# Patient Record
Sex: FEMALE | Employment: UNEMPLOYED | URBAN - METROPOLITAN AREA
[De-identification: names, ages, dates, MRNs, and addresses within clinical notes are randomized per-mention and may not be internally consistent; named-entity substitution may affect disease eponyms.]

---

## 2021-06-05 ENCOUNTER — OFFICE VISIT (OUTPATIENT)
Dept: URGENT CARE | Facility: CLINIC | Age: 46
End: 2021-06-05
Payer: MEDICARE

## 2021-06-05 VITALS
HEART RATE: 109 BPM | HEIGHT: 66 IN | OXYGEN SATURATION: 98 % | RESPIRATION RATE: 18 BRPM | SYSTOLIC BLOOD PRESSURE: 149 MMHG | BODY MASS INDEX: 17.68 KG/M2 | TEMPERATURE: 98.3 F | WEIGHT: 110 LBS | DIASTOLIC BLOOD PRESSURE: 106 MMHG

## 2021-06-05 DIAGNOSIS — R59.1 LYMPHADENOPATHY: ICD-10-CM

## 2021-06-05 DIAGNOSIS — J04.0 ACUTE LARYNGITIS: Primary | ICD-10-CM

## 2021-06-05 PROCEDURE — 99213 OFFICE O/P EST LOW 20 MIN: CPT | Performed by: PHYSICIAN ASSISTANT

## 2021-06-05 RX ORDER — ALBUTEROL SULFATE 90 UG/1
2 AEROSOL, METERED RESPIRATORY (INHALATION) EVERY 6 HOURS PRN
COMMUNITY

## 2021-06-05 RX ORDER — AMOXICILLIN 875 MG/1
875 TABLET, COATED ORAL 2 TIMES DAILY
Qty: 14 TABLET | Refills: 0 | Status: SHIPPED | OUTPATIENT
Start: 2021-06-05 | End: 2021-06-12

## 2021-06-05 NOTE — PATIENT INSTRUCTIONS
Acute Laryngitis with lymphadenopathy:   -Rapid strep was negative, will send out for culture  Will treat empirically with Amoxicillin 875mg taken as prescribed for laryngitis and possible infectious etiology  Take with food and a probiotic  We discussed that her sx are likely compounded by her TMJ  We discussed use of a mouth guard and follow up with her Dentist or PCP  -Warm salt water gargles and tea with honey may provide relief   Stay well hydrated and rest    -You can take Cepacol throat drops or spray for local pain relief of the throat    -You can take Advil or Tylenol for fever or pain    -If your symptoms worsen or change follow up with your PCP for a recheck

## 2021-06-05 NOTE — PROGRESS NOTES
[facility]        NAME: Sharon Phillips is a 54 y o  female  : 1975    MRN: [id number]  DATE: 2021  TIME: 3:24 PM    Assessment and Plan   Acute laryngitis [J04 0]  1  Acute laryngitis  amoxicillin (AMOXIL) 875 mg tablet   2  Lymphadenopathy           Patient Instructions   Acute Laryngitis with lymphadenopathy:   -Rapid strep was negative, will send out for culture  Will treat empirically with Amoxicillin 875mg taken as prescribed for laryngitis and possible infectious etiology  Take with food and a probiotic  We discussed that her sx are likely compounded by her TMJ  We discussed use of a mouth guard and follow up with her Dentist or PCP  -Warm salt water gargles and tea with honey may provide relief  Stay well hydrated and rest    -You can take Cepacol throat drops or spray for local pain relief of the throat    -You can take Advil or Tylenol for fever or pain    -If your symptoms worsen or change follow up with your PCP for a recheck     Follow up with PCP in 3-5 days  Proceed to  ER if symptoms worsen  Chief Complaint     Chief Complaint   Patient presents with    Jaw Pain     Pt reports of right sided jaw, neck pain started approx 4 weeks ago  History of Present Illness       The patient is a 13-year-old female who presents today for a four week hx of R sided jaw pain and a two day hx of lymphadenopathy and hoarsness  The patient states that one month ago her sx began with R sided jaw pain and stiffness  She also has R sided cervical lymphadenopathy  She states that today she began to experience hoarseness  There is no fever, chills, body aches  No pharyngitis  No trouble swallowing  No dyspnea, stridor, wheezing  No dyspnea, cp, palpitations  She has a hx of asthma and seasonal allergies  She saw Dr Posey who is her Allergist two weeks and diagnosed with her TMJ and does not feel that her pain is secondary to infection  She has good PO intake         Review of Systems Review of Systems   Constitutional: Negative for activity change, appetite change, chills, diaphoresis, fatigue and fever  HENT: Positive for sore throat and voice change  Negative for congestion, ear discharge, ear pain, facial swelling, hearing loss, postnasal drip, rhinorrhea, sinus pressure, sinus pain, tinnitus and trouble swallowing  Eyes: Negative for visual disturbance  Respiratory: Negative for apnea, cough, chest tightness, shortness of breath, wheezing and stridor  Cardiovascular: Negative for chest pain, palpitations and leg swelling  Gastrointestinal: Negative for abdominal distention and abdominal pain  Genitourinary: Negative for decreased urine volume  Musculoskeletal: Positive for arthralgias  Negative for joint swelling, myalgias, neck pain and neck stiffness  Skin: Negative for rash  Allergic/Immunologic: Negative for immunocompromised state  Neurological: Negative for dizziness, weakness, light-headedness, numbness and headaches  Hematological: Positive for adenopathy           Current Medications       Current Outpatient Medications:     albuterol (PROVENTIL HFA,VENTOLIN HFA) 90 mcg/act inhaler, Inhale 2 puffs every 6 (six) hours as needed for wheezing, Disp: , Rfl:     amoxicillin (AMOXIL) 875 mg tablet, Take 1 tablet (875 mg total) by mouth 2 (two) times a day for 7 days, Disp: 14 tablet, Rfl: 0    Current Allergies     Allergies as of 06/05/2021 - Reviewed 06/05/2021   Allergen Reaction Noted    Metaxalone Anaphylaxis 09/16/2018    Codeine Hives 09/26/2018    Dicyclomine GI Intolerance 09/16/2018    Doxycycline GI Intolerance 09/16/2018    Hydrocodone-acetaminophen Hives 09/26/2018    Methadone GI Intolerance 09/16/2018            The following portions of the patient's history were reviewed and updated as appropriate: allergies, current medications, past family history, past medical history, past social history, past surgical history and problem list  Past Medical History:   Diagnosis Date    Anxiety     Endometriosis        History reviewed  No pertinent surgical history  History reviewed  No pertinent family history  Medications have been verified  Objective   BP (!) 149/106   Pulse (!) 109   Temp 98 3 °F (36 8 °C)   Resp 18   Ht 5' 6" (1 676 m)   Wt 49 9 kg (110 lb)   SpO2 98%   BMI 17 75 kg/m²   No LMP recorded  Physical Exam     Physical Exam  Vitals and nursing note reviewed  Constitutional:       General: She is not in acute distress  Appearance: She is well-developed  She is not diaphoretic  HENT:      Head: Normocephalic and atraumatic  Right Ear: Hearing, tympanic membrane, ear canal and external ear normal       Left Ear: Hearing, tympanic membrane, ear canal and external ear normal       Nose: Nose normal  No mucosal edema or rhinorrhea  Right Sinus: No maxillary sinus tenderness or frontal sinus tenderness  Left Sinus: No maxillary sinus tenderness or frontal sinus tenderness  Mouth/Throat:      Lips: Pink  Mouth: Mucous membranes are moist       Pharynx: Uvula midline  Pharyngeal swelling and posterior oropharyngeal erythema present  No oropharyngeal exudate or uvula swelling  Tonsils: No tonsillar exudate or tonsillar abscesses  1+ on the right  1+ on the left  Cardiovascular:      Rate and Rhythm: Normal rate and regular rhythm  Heart sounds: S1 normal and S2 normal  Heart sounds not distant  No murmur heard  No friction rub  No gallop  No S3 or S4 sounds  Pulmonary:      Effort: No tachypnea, bradypnea, accessory muscle usage or respiratory distress  Breath sounds: No decreased breath sounds, wheezing, rhonchi or rales  Musculoskeletal:      Cervical back: Normal range of motion and neck supple  Lymphadenopathy:      Cervical: Cervical adenopathy present  Right cervical: Superficial cervical adenopathy present     Neurological:      Mental Status: She is alert and oriented to person, place, and time     Psychiatric:         Behavior: Behavior normal